# Patient Record
Sex: MALE | Race: WHITE | NOT HISPANIC OR LATINO | Employment: UNEMPLOYED | URBAN - METROPOLITAN AREA
[De-identification: names, ages, dates, MRNs, and addresses within clinical notes are randomized per-mention and may not be internally consistent; named-entity substitution may affect disease eponyms.]

---

## 2022-12-18 ENCOUNTER — APPOINTMENT (OUTPATIENT)
Dept: CT IMAGING | Facility: HOSPITAL | Age: 47
End: 2022-12-18

## 2022-12-18 ENCOUNTER — APPOINTMENT (OUTPATIENT)
Dept: ULTRASOUND IMAGING | Facility: HOSPITAL | Age: 47
End: 2022-12-18

## 2022-12-18 ENCOUNTER — HOSPITAL ENCOUNTER (EMERGENCY)
Facility: HOSPITAL | Age: 47
Discharge: HOME | End: 2022-12-19
Attending: EMERGENCY MEDICINE | Admitting: EMERGENCY MEDICINE

## 2022-12-18 VITALS
BODY MASS INDEX: 32.88 KG/M2 | HEART RATE: 57 BPM | SYSTOLIC BLOOD PRESSURE: 183 MMHG | OXYGEN SATURATION: 92 % | DIASTOLIC BLOOD PRESSURE: 109 MMHG | HEIGHT: 68 IN | TEMPERATURE: 97.6 F | RESPIRATION RATE: 20 BRPM | WEIGHT: 216.93 LBS

## 2022-12-18 DIAGNOSIS — C78.7 PANCREATIC CANCER METASTASIZED TO LIVER: ICD-10-CM

## 2022-12-18 DIAGNOSIS — R10.84 GENERALIZED ABDOMINAL PAIN: Primary | ICD-10-CM

## 2022-12-18 DIAGNOSIS — C25.9 PANCREATIC CANCER METASTASIZED TO LIVER: ICD-10-CM

## 2022-12-18 LAB
ALBUMIN SERPL-MCNC: 3.7 G/DL (ref 3.5–5.2)
ALBUMIN/GLOB SERPL: 1 G/DL
ALP SERPL-CCNC: 516 U/L (ref 39–117)
ALT SERPL W P-5'-P-CCNC: 79 U/L (ref 1–41)
ANION GAP SERPL CALCULATED.3IONS-SCNC: 10.6 MMOL/L (ref 5–15)
AST SERPL-CCNC: 47 U/L (ref 1–40)
BASOPHILS # BLD AUTO: 0.03 10*3/MM3 (ref 0–0.2)
BASOPHILS NFR BLD AUTO: 0.5 % (ref 0–1.5)
BILIRUB SERPL-MCNC: 1.7 MG/DL (ref 0–1.2)
BILIRUB UR QL STRIP: ABNORMAL
BUN SERPL-MCNC: 14 MG/DL (ref 6–20)
BUN/CREAT SERPL: 16.3 (ref 7–25)
CALCIUM SPEC-SCNC: 9.7 MG/DL (ref 8.6–10.5)
CHLORIDE SERPL-SCNC: 98 MMOL/L (ref 98–107)
CLARITY UR: CLEAR
CO2 SERPL-SCNC: 26.4 MMOL/L (ref 22–29)
COLOR UR: ABNORMAL
CREAT SERPL-MCNC: 0.86 MG/DL (ref 0.76–1.27)
DEPRECATED RDW RBC AUTO: 42.3 FL (ref 37–54)
EGFRCR SERPLBLD CKD-EPI 2021: 108.1 ML/MIN/1.73
EOSINOPHIL # BLD AUTO: 0.12 10*3/MM3 (ref 0–0.4)
EOSINOPHIL NFR BLD AUTO: 2 % (ref 0.3–6.2)
ERYTHROCYTE [DISTWIDTH] IN BLOOD BY AUTOMATED COUNT: 13.1 % (ref 12.3–15.4)
GLOBULIN UR ELPH-MCNC: 3.6 GM/DL
GLUCOSE SERPL-MCNC: 273 MG/DL (ref 65–99)
GLUCOSE UR STRIP-MCNC: ABNORMAL MG/DL
HCT VFR BLD AUTO: 40.6 % (ref 37.5–51)
HGB BLD-MCNC: 13.6 G/DL (ref 13–17.7)
HGB UR QL STRIP.AUTO: NEGATIVE
HOLD SPECIMEN: NORMAL
HOLD SPECIMEN: NORMAL
IMM GRANULOCYTES # BLD AUTO: 0.1 10*3/MM3 (ref 0–0.05)
IMM GRANULOCYTES NFR BLD AUTO: 1.7 % (ref 0–0.5)
INR PPP: 1.06 (ref 0.86–1.15)
KETONES UR QL STRIP: NEGATIVE
LEUKOCYTE ESTERASE UR QL STRIP.AUTO: NEGATIVE
LIPASE SERPL-CCNC: 34 U/L (ref 13–60)
LYMPHOCYTES # BLD AUTO: 1.67 10*3/MM3 (ref 0.7–3.1)
LYMPHOCYTES NFR BLD AUTO: 27.6 % (ref 19.6–45.3)
MCH RBC QN AUTO: 29.8 PG (ref 26.6–33)
MCHC RBC AUTO-ENTMCNC: 33.5 G/DL (ref 31.5–35.7)
MCV RBC AUTO: 89 FL (ref 79–97)
MONOCYTES # BLD AUTO: 0.72 10*3/MM3 (ref 0.1–0.9)
MONOCYTES NFR BLD AUTO: 11.9 % (ref 5–12)
NEUTROPHILS NFR BLD AUTO: 3.41 10*3/MM3 (ref 1.7–7)
NEUTROPHILS NFR BLD AUTO: 56.3 % (ref 42.7–76)
NITRITE UR QL STRIP: NEGATIVE
NRBC BLD AUTO-RTO: 0 /100 WBC (ref 0–0.2)
PH UR STRIP.AUTO: 7.5 [PH] (ref 5–8)
PLATELET # BLD AUTO: 229 10*3/MM3 (ref 140–450)
PMV BLD AUTO: 10.6 FL (ref 6–12)
POTASSIUM SERPL-SCNC: 4.2 MMOL/L (ref 3.5–5.2)
PROT SERPL-MCNC: 7.3 G/DL (ref 6–8.5)
PROT UR QL STRIP: ABNORMAL
PROTHROMBIN TIME: 14 SECONDS (ref 11.8–14.9)
RBC # BLD AUTO: 4.56 10*6/MM3 (ref 4.14–5.8)
SODIUM SERPL-SCNC: 135 MMOL/L (ref 136–145)
SP GR UR STRIP: >1.03 (ref 1–1.03)
UROBILINOGEN UR QL STRIP: ABNORMAL
WBC NRBC COR # BLD: 6.05 10*3/MM3 (ref 3.4–10.8)
WHOLE BLOOD HOLD COAG: NORMAL
WHOLE BLOOD HOLD SPECIMEN: NORMAL

## 2022-12-18 PROCEDURE — 25010000002 ONDANSETRON PER 1 MG: Performed by: EMERGENCY MEDICINE

## 2022-12-18 PROCEDURE — 80053 COMPREHEN METABOLIC PANEL: CPT

## 2022-12-18 PROCEDURE — 83690 ASSAY OF LIPASE: CPT

## 2022-12-18 PROCEDURE — 81003 URINALYSIS AUTO W/O SCOPE: CPT | Performed by: EMERGENCY MEDICINE

## 2022-12-18 PROCEDURE — 99283 EMERGENCY DEPT VISIT LOW MDM: CPT

## 2022-12-18 PROCEDURE — 85610 PROTHROMBIN TIME: CPT | Performed by: EMERGENCY MEDICINE

## 2022-12-18 PROCEDURE — 36415 COLL VENOUS BLD VENIPUNCTURE: CPT

## 2022-12-18 PROCEDURE — 25010000002 DROPERIDOL PER 5 MG: Performed by: NURSE PRACTITIONER

## 2022-12-18 PROCEDURE — 96361 HYDRATE IV INFUSION ADD-ON: CPT

## 2022-12-18 PROCEDURE — 25010000002 KETOROLAC TROMETHAMINE PER 15 MG: Performed by: EMERGENCY MEDICINE

## 2022-12-18 PROCEDURE — 96375 TX/PRO/DX INJ NEW DRUG ADDON: CPT

## 2022-12-18 PROCEDURE — 25010000002 DICYCLOMINE PER 20 MG: Performed by: NURSE PRACTITIONER

## 2022-12-18 PROCEDURE — 76705 ECHO EXAM OF ABDOMEN: CPT

## 2022-12-18 PROCEDURE — 85025 COMPLETE CBC W/AUTO DIFF WBC: CPT

## 2022-12-18 PROCEDURE — 74177 CT ABD & PELVIS W/CONTRAST: CPT

## 2022-12-18 PROCEDURE — 0 IOPAMIDOL PER 1 ML: Performed by: EMERGENCY MEDICINE

## 2022-12-18 PROCEDURE — 96372 THER/PROPH/DIAG INJ SC/IM: CPT

## 2022-12-18 PROCEDURE — 96374 THER/PROPH/DIAG INJ IV PUSH: CPT

## 2022-12-18 RX ORDER — SODIUM CHLORIDE 0.9 % (FLUSH) 0.9 %
10 SYRINGE (ML) INJECTION AS NEEDED
Status: DISCONTINUED | OUTPATIENT
Start: 2022-12-18 | End: 2022-12-19 | Stop reason: HOSPADM

## 2022-12-18 RX ORDER — DICYCLOMINE HYDROCHLORIDE 10 MG/ML
20 INJECTION INTRAMUSCULAR ONCE
Status: COMPLETED | OUTPATIENT
Start: 2022-12-18 | End: 2022-12-18

## 2022-12-18 RX ORDER — ONDANSETRON 2 MG/ML
4 INJECTION INTRAMUSCULAR; INTRAVENOUS ONCE
Status: COMPLETED | OUTPATIENT
Start: 2022-12-18 | End: 2022-12-18

## 2022-12-18 RX ORDER — DROPERIDOL 2.5 MG/ML
2.5 INJECTION, SOLUTION INTRAMUSCULAR; INTRAVENOUS ONCE
Status: COMPLETED | OUTPATIENT
Start: 2022-12-18 | End: 2022-12-18

## 2022-12-18 RX ORDER — KETOROLAC TROMETHAMINE 30 MG/ML
30 INJECTION, SOLUTION INTRAMUSCULAR; INTRAVENOUS ONCE
Status: COMPLETED | OUTPATIENT
Start: 2022-12-18 | End: 2022-12-18

## 2022-12-18 RX ADMIN — IOPAMIDOL 100 ML: 755 INJECTION, SOLUTION INTRAVENOUS at 20:30

## 2022-12-18 RX ADMIN — KETOROLAC TROMETHAMINE 30 MG: 30 INJECTION, SOLUTION INTRAMUSCULAR; INTRAVENOUS at 20:01

## 2022-12-18 RX ADMIN — ONDANSETRON 4 MG: 2 INJECTION INTRAMUSCULAR; INTRAVENOUS at 20:01

## 2022-12-18 RX ADMIN — DICYCLOMINE HYDROCHLORIDE 20 MG: 20 INJECTION, SOLUTION INTRAMUSCULAR at 22:11

## 2022-12-18 RX ADMIN — SODIUM CHLORIDE 1000 ML: 9 INJECTION, SOLUTION INTRAVENOUS at 22:00

## 2022-12-18 RX ADMIN — DROPERIDOL 2.5 MG: 2.5 INJECTION, SOLUTION INTRAMUSCULAR; INTRAVENOUS at 22:02

## 2022-12-19 PROCEDURE — 96375 TX/PRO/DX INJ NEW DRUG ADDON: CPT

## 2022-12-19 PROCEDURE — 25010000002 MORPHINE PER 10 MG: Performed by: EMERGENCY MEDICINE

## 2022-12-19 RX ORDER — DICYCLOMINE HCL 20 MG
20 TABLET ORAL EVERY 6 HOURS
Qty: 20 TABLET | Refills: 0 | Status: SHIPPED | OUTPATIENT
Start: 2022-12-19

## 2022-12-19 RX ORDER — HYDROCODONE BITARTRATE AND ACETAMINOPHEN 7.5; 325 MG/1; MG/1
1 TABLET ORAL EVERY 6 HOURS PRN
Qty: 10 TABLET | Refills: 0 | Status: CANCELLED | OUTPATIENT
Start: 2022-12-19

## 2022-12-19 RX ORDER — ONDANSETRON 4 MG/1
4 TABLET, ORALLY DISINTEGRATING ORAL EVERY 8 HOURS PRN
Qty: 10 TABLET | Refills: 0 | Status: SHIPPED | OUTPATIENT
Start: 2022-12-19

## 2022-12-19 RX ORDER — PROMETHAZINE HYDROCHLORIDE 25 MG/1
25 SUPPOSITORY RECTAL EVERY 6 HOURS PRN
Qty: 10 SUPPOSITORY | Refills: 0 | Status: SHIPPED | OUTPATIENT
Start: 2022-12-19

## 2022-12-19 RX ADMIN — MORPHINE SULFATE 4 MG: 4 INJECTION, SOLUTION INTRAMUSCULAR; INTRAVENOUS at 00:58

## 2022-12-19 NOTE — DISCHARGE INSTRUCTIONS
No other acute findings noted other than  your pancreatic mass and some liver metastasis    Follow-up with your oncologist in [place].  Call today to discuss any additional treatment or pain management.    Take medications as prescribed for symptomatic treatment

## 2022-12-19 NOTE — ED NOTES
Pt complains of abdominal pain the the center abd that started 3-4 days ago. Pt is bent over in bed in pain and tearful. Pt had last chemo treatment approx. 2 weeks ago.

## 2022-12-19 NOTE — ED PROVIDER NOTES
Time: 7:38 PM EST  Chief Complaint:   Chief Complaint   Patient presents with   • Abdominal Pain           History of Present Illness:  Patient is a 46 y.o. year old male who presents to the emergency department with periumbilical abdominal pain and dry heaving x3 days.  History of pancreatic cancer.  Patient denies fever, chills, vomiting and diarrhea.  Denies a history of diverticulitis or Crohn's.  Patient currently receiving chemo every 2 weeks in [place] by Dr. Charles.  Patient canceled chemo this past week on Wednesday because he did not want to be sick for [place] so its been almost 3 weeks      History provided by:  Patient  Abdominal Pain  Pain location:  Generalized  Pain quality: aching, cramping and sharp    Pain radiates to:  Does not radiate  Pain severity:  Moderate  Onset quality:  Gradual  Duration:  3 days  Timing:  Constant  Progression:  Unchanged  Chronicity:  New  Relieved by:  Nothing  Worsened by:  Eating, palpation, vomiting and movement  Ineffective treatments: home nausea med not helping.  Associated symptoms: nausea    Associated symptoms: no anorexia, no belching, no chest pain, no chills, no constipation, no cough, no diarrhea, no dysuria, no fatigue, no fever, no flatus, no hematemesis, no hematochezia, no hematuria, no melena, no shortness of breath, no sore throat and no vomiting            Patient Care Team  Primary Care Provider: Provider, No Known    Past Medical History:     No Known Allergies  History reviewed. No pertinent past medical history.  History reviewed. No pertinent surgical history.  History reviewed. No pertinent family history.    Home Medications:  Prior to Admission medications    Not on File        Social History:          Review of Systems:  Review of Systems   Constitutional: Negative for chills, fatigue and fever.   HENT: Negative for ear pain, rhinorrhea and sore throat.    Eyes: Negative for visual disturbance.   Respiratory: Negative for cough and  "shortness of breath.    Cardiovascular: Negative for chest pain.   Gastrointestinal: Positive for abdominal pain and nausea. Negative for anorexia, constipation, diarrhea, flatus, hematemesis, hematochezia, melena and vomiting.   Genitourinary: Negative for difficulty urinating, dysuria, flank pain and hematuria.   Musculoskeletal: Negative for arthralgias, back pain and myalgias.   Skin: Negative for rash.   Neurological: Negative for light-headedness and headaches.   Hematological: Negative for adenopathy.   Psychiatric/Behavioral: Negative.         Physical Exam:  BP (!) 183/109   Pulse 57   Temp 97.6 °F (36.4 °C) (Oral)   Resp 20   Ht 172.7 cm (68\")   Wt 98.4 kg (216 lb 14.9 oz)   SpO2 92%   BMI 32.98 kg/m²     Physical Exam  Vitals and nursing note reviewed.   Constitutional:       General: He is not in acute distress.     Appearance: Normal appearance. He is not toxic-appearing.   HENT:      Head: Normocephalic and atraumatic.      Nose: Nose normal.      Mouth/Throat:      Mouth: Mucous membranes are moist.   Eyes:      Extraocular Movements: Extraocular movements intact.      Conjunctiva/sclera: Conjunctivae normal.   Cardiovascular:      Rate and Rhythm: Normal rate and regular rhythm.      Pulses: Normal pulses.      Heart sounds: Normal heart sounds.   Pulmonary:      Effort: Pulmonary effort is normal.      Breath sounds: Normal breath sounds.   Abdominal:      General: Bowel sounds are normal. There is no distension.      Palpations: Abdomen is soft.      Tenderness: There is generalized abdominal tenderness. There is no right CVA tenderness or left CVA tenderness.   Musculoskeletal:         General: Normal range of motion.      Cervical back: Normal range of motion.   Skin:     General: Skin is warm and dry.      Coloration: Skin is not cyanotic.   Neurological:      General: No focal deficit present.      Mental Status: He is alert and oriented to person, place, and time.   Psychiatric:         " Attention and Perception: Attention and perception normal.         Mood and Affect: Mood normal.         Behavior: Behavior normal.         Thought Content: Thought content normal.         Judgment: Judgment normal.                Medications in the Emergency Department:  Medications   sodium chloride 0.9 % flush 10 mL (has no administration in time range)   morphine injection 4 mg (has no administration in time range)   ondansetron (ZOFRAN) injection 4 mg (4 mg Intravenous Given 12/18/22 2001)   ketorolac (TORADOL) injection 30 mg (30 mg Intravenous Given 12/18/22 2001)   iopamidol (ISOVUE-370) 76 % injection 100 mL (100 mL Intravenous Given 12/18/22 2030)   dicyclomine (BENTYL) injection 20 mg (20 mg Intramuscular Given 12/18/22 2211)   droperidol (INAPSINE) injection 2.5 mg (2.5 mg Intravenous Given 12/18/22 2202)   sodium chloride 0.9 % bolus 1,000 mL (1,000 mL Intravenous New Bag 12/18/22 2200)        Labs  Lab Results (last 24 hours)     Procedure Component Value Units Date/Time    CBC & Differential [[id number]]  (Abnormal) Collected: 12/18/22 1957    Specimen: Blood Updated: 12/18/22 2004    Narrative:      The following orders were created for panel order CBC & Differential.  Procedure                               Abnormality         Status                     ---------                               -----------         ------                     CBC Auto Differential[[id number]]        Abnormal            Final result                 Please view results for these tests on the individual orders.    Comprehensive Metabolic Panel [[id number]]  (Abnormal) Collected: 12/18/22 1957    Specimen: Blood Updated: 12/18/22 2023     Glucose 273 mg/dL      BUN 14 mg/dL      Creatinine 0.86 mg/dL      Sodium 135 mmol/L      Potassium 4.2 mmol/L      Chloride 98 mmol/L      CO2 26.4 mmol/L      Calcium 9.7 mg/dL      Total Protein 7.3 g/dL      Albumin 3.70 g/dL      ALT (SGPT) 79 U/L      AST (SGOT) 47 U/L      Alkaline  Phosphatase 516 U/L      Total Bilirubin 1.7 mg/dL      Globulin 3.6 gm/dL      A/G Ratio 1.0 g/dL      BUN/Creatinine Ratio 16.3     Anion Gap 10.6 mmol/L      eGFR 108.1 mL/min/1.73      Comment: National Kidney Foundation and American Society of Nephrology (ASN) Task Force recommended calculation based on the Chronic Kidney Disease Epidemiology Collaboration (CKD-EPI) equation refit without adjustment for race.       Narrative:      GFR Normal >60  Chronic Kidney Disease <60  Kidney Failure <15      Lipase [[id number]]  (Normal) Collected: 12/18/22 1957    Specimen: Blood Updated: 12/18/22 2023     Lipase 34 U/L     CBC Auto Differential [[id number]]  (Abnormal) Collected: 12/18/22 1957    Specimen: Blood Updated: 12/18/22 2004     WBC 6.05 10*3/mm3      RBC 4.56 10*6/mm3      Hemoglobin 13.6 g/dL      Hematocrit 40.6 %      MCV 89.0 fL      MCH 29.8 pg      MCHC 33.5 g/dL      RDW 13.1 %      RDW-SD 42.3 fl      MPV 10.6 fL      Platelets 229 10*3/mm3      Neutrophil % 56.3 %      Lymphocyte % 27.6 %      Monocyte % 11.9 %      Eosinophil % 2.0 %      Basophil % 0.5 %      Immature Grans % 1.7 %      Neutrophils, Absolute 3.41 10*3/mm3      Lymphocytes, Absolute 1.67 10*3/mm3      Monocytes, Absolute 0.72 10*3/mm3      Eosinophils, Absolute 0.12 10*3/mm3      Basophils, Absolute 0.03 10*3/mm3      Immature Grans, Absolute 0.10 10*3/mm3      nRBC 0.0 /100 WBC     Protime-INR [[id number]]  (Normal) Collected: 12/18/22 1957    Specimen: Blood Updated: 12/18/22 2038     Protime 14.0 Seconds      INR 1.06    Narrative:      Suggested Therapeutic Ranges For Oral Anticoagulant Therapy:  Level of Therapy                      INR Target Range  Standard Dose                            2.0-3.0  High Dose                                2.5-3.5  Patients not receiving anticoagulant  Therapy Normal Range                     0.86-1.15    Urinalysis With Microscopic If Indicated (No Culture) - Urine, Clean Catch [[id number]]   (Abnormal) Collected: 12/18/22 2125    Specimen: Urine, Clean Catch Updated: 12/18/22 2133     Color, UA Dark Yellow     Appearance, UA Clear     pH, UA 7.5     Specific Gravity, UA >1.030     Glucose, UA >=1000 mg/dL (3+)     Ketones, UA Negative     Bilirubin, UA Small (1+)     Blood, UA Negative     Protein, UA Trace     Leuk Esterase, UA Negative     Nitrite, UA Negative     Urobilinogen, UA 2.0 E.U./dL    Narrative:      Urine microscopic not indicated.           Imaging:  CT Abdomen Pelvis With Contrast    Result Date: 12/18/2022  PROCEDURE: CT ABDOMEN PELVIS W CONTRAST  COMPARISON: None.  INDICATIONS: GENERALIZED ABDOMINAL PAIN; H/O PANCREATIC, LIVER, & TESTICULAR CANCER PER PT.  TECHNIQUE: After obtaining the patient's consent, 674 CT images were created with non-ionic intravenous contrast material.  No oral contrast agent was administered for the study.  PROTOCOL:   Standard CT imaging protocol performed.    RADIATION:   DLP: 1192mGy*cm   Automated exposure control was utilized to minimize radiation dose. CONTRAST: 93 mL Isovue 370 I.V. LABS:   eGFR: >60 mL/min/1.73m^2  FINDINGS: Hepatic metastases are seen.  These findings are estimated at about 5 cm in greatest axial diameter.  They measure about 6.1 cm in greatest craniocaudal extent (such as seen on image 72 of series 202). Probably no hepatosplenomegaly.  There may be extension of a large liver metastasis beyond the hepatic capsule into the lower posterolateral right chest wall and/or right upper abdominal wall (posterolateral aspect), such as seen on image 28 of series 201.  Grossly, no portal vein thrombosis is suggested.  There is abnormal prominence of the tail of the pancreas with a suspected necrotic pancreatic mass, measuring about 7.1 x 5 cm in long-axis axial and short-axis axial dimensions, respectively, as on image 40 of series 201.  This finding measures nearly 4 cm in greatest craniocaudal extent (as on image 65 of series 202).  This  finding likely corresponds to the patient's known pancreatic mass.  Enlarged upper abdominal lymph nodes are seen, especially in the paragastric, portacaval, madeline hepatis regions.  There is an abnormal thickened appearance of the gallbladder wall.  Gallstones cannot be excluded.  Age-indeterminate cholecystitis is possible.  A malignant process involving the gallbladder cannot be excluded.  No definite biliary ductal dilatation is seen.  No choledocholithiasis is suggested.  No aneurysmal dilatation of the aorta is suggested.  No acute intraperitoneal or retroperitoneal hemorrhage.  No enhanced CT evidence of nephrolithiasis or ureterolithiasis.  No acute pyelonephritis.  No hydronephrosis.  No adrenal mass.  No mechanical bowel obstruction.  No definite bowel wall thickening.  No acute appendicitis.  There may be scattered colonic diverticula.  No acute diverticulitis or colitis.  No pneumoperitoneum or pneumatosis.  No urinary bladder calculi are seen.  The patient may have undergone left-sided orchiectomy.  Please correlate clinically.  Probably no retroperitoneal lymphadenopathy is seen.  There are small scattered retroperitoneal lymph nodes.  There is probably osteonecrosis of the femoral heads, which is age-indeterminate, but probably chronic.  No definite osseous metastatic disease is suggested. Extensive ossification of the anterior longitudinal ligament is seen and may represent diffuse idiopathic skeletal hyperostosis (DISH).  A small fat-containing umbilical hernia is identified.  It does not contain bowel.  There are coronary artery calcifications.  Minimal, if any, ascites is seen.        1. Advanced metastatic pancreatic carcinoma is suggested with hepatic and lymphatic metastases noted.  2. The patient may have undergone left-sided orchiectomy.  3. Osteonecrosis involves the bilateral femoral heads.  4. No definite osseous metastatic disease is appreciated.  5. There are coronary artery  calcifications.  6. There is a nonspecific abnormal CT appearance of the gallbladder.  7. Consider correlation with any relevant outside imaging studies if available.  8. Otherwise, no acute findings are seen. 9. Please see above comments for further detail.   1.   Please note that portions of this note were completed with a voice recognition program.  GABBY HINOJOSA JR, MD       Electronically Signed and Approved By: GABBY HINOJOSA JR, MD on 12/18/2022 at 21:53              US Gallbladder    Result Date: 12/19/2022  PROCEDURE: US GALLBLADDER  COMPARISON: Abdominal/pelvic CT examination, with intravenous contrast agent, 12/18/2022.  INDICATIONS: Unspecified abdominal pain; h/o pancreatic carcinoma.  TECHNIQUE: A limited abdominal ultrasound examination of the right upper quadrant was performed, tailored in order to evaluate the gallbladder and the biliary tree.   FINDINGS: Two-dimensional grayscale images as well as color and spectral Doppler analysis are provided for review.  Again demonstrated are multiple hepatic metastases.  They may measure as great is 6 cm.  The gallbladder is not well seen.  Diffuse gallbladder wall thickening is suggested and is roughly estimated at 6 mm in greatest thickness.  This finding is age-indeterminate.  It is unable to be determined by this study as to whether or not there are gallstones.  The pancreas is not well seen, obscured by bowel gas.  There is a partially imaged hypoechoic mass involving the pancreatic tail and body, which measures about 4.3 cm in maximum diameter by this ultrasound examination.  No right-sided hydronephrosis is seen.  No biliary ductal dilatation is suggested.  A negative sonographic Silverio's sign was reported.  Doppler interrogation of the hepatic vasculature reveals patent vessels with normal blood flow direction.  The maximum craniocaudal dimension of the liver by ultrasound is 17.2 cm.  The pole to pole length of the right kidney is 12.2 cm.  The  maximum diameter of the common bile duct is 3.5 mm.  The left kidney, spleen, inferior vena cava (IVC), and abdominal aorta were not evaluated.        1. Age-indeterminate gallbladder wall thickening is seen.  Gallstones are not definitely identified.  2. A negative sonographic Silverio's sign was reported.  3. No biliary ductal dilatation.  4. Hepatic metastases are present.  5. There is a pancreatic tail mass, measuring about 4.3 cm.  6. No ascites. 7. No right hydronephrosis.  8. Please see above comments for further detail.    Please note that portions of this note were completed with a voice recognition program.  GABBY HINOJOSA JR, MD       Electronically Signed and Approved By: GABBY HINOJOSA JR, MD on 12/19/2022 at 0:22               Procedures:  Procedures    Progress  ED Course as of 12/19/22 0052   Sun Dec 18, 2022   1940 --- PROVIDER IN TRIAGE NOTE ---    The patient was evaluated my Clint ortega in triage. Orders were placed and the patient is currently awaiting disposition.    [AJ]      ED Course User Index  [AJ] Clint Velasquez PA-C                            The patient was initially evaluated in the triage area where orders were placed. The patient was later dispositioned by SIVAKUMAR Guillen.      The patient was advised to stay for completion of workup which includes but is not limited to communication of labs and radiological results, reassessment and plan. The patient was advised that leaving prior to disposition by a provider could result in critical findings that are not communicated to the patient.     Medical Decision Making:  MDM  Number of Diagnoses or Management Options  Generalized abdominal pain  Pancreatic cancer metastasized to liver (HCC)  Diagnosis management comments: The patient is resting comfortably and feels better, is alert and in no distress. Repeat examination is unremarkable and benign; in particular, there's no discomfort at McBurney's point and there is no  pulsatile mass. The history, exam, diagnostic testing, and current condition does not suggest acute appendicitis, bowel obstruction, acute cholecystitis, bowel perforation, major gastrointestinal bleeding, severe diverticulitis, abdominal aortic aneurysm, mesenteric ischemia, volvulus, sepsis, or other significant pathology that warrants further testing, continued ED treatment, admission, for surgical evaluation at this point. The vital signs have been stable. The patient does not have uncontrollable pain, intractable vomiting, or other significant symptoms. The patient's condition is stable and appropriate for discharge from the emergency department.         Amount and/or Complexity of Data Reviewed  Clinical lab tests: reviewed and ordered  Tests in the radiology section of CPT®: reviewed and ordered  Tests in the medicine section of CPT®: ordered and reviewed  Decide to obtain previous medical records or to obtain history from someone other than the patient: yes    Risk of Complications, Morbidity, and/or Mortality  Presenting problems: moderate  Diagnostic procedures: moderate  Management options: low    Patient Progress  Patient progress: stable           The following orders were placed after triage and evaluation:  Orders Placed This Encounter   Procedures   • CT Abdomen Pelvis With Contrast   • US Gallbladder   • [place] Draw   • Comprehensive Metabolic Panel   • Lipase   • Urinalysis With Microscopic If Indicated (No Culture) - Urine, Clean Catch   • CBC Auto Differential   • Protime-INR   • NPO Diet NPO Type: Strict NPO   • Undress & Gown   • Insert Peripheral IV   • CBC & Differential   • Green Top (Gel)   • Lavender Top   • Gold Top - SST   • Light Blue Top       Final diagnoses:   Generalized abdominal pain   Pancreatic cancer metastasized to liver (HCC)          Disposition:  ED Disposition     ED Disposition   Discharge    Condition   Stable    Comment   --             This medical record created  using voice recognition software.           Radha Mistry, APRN  12/19/22 0052

## 2023-01-09 ENCOUNTER — TELEPHONE (OUTPATIENT)
Dept: FAMILY MEDICINE CLINIC | Facility: CLINIC | Age: 48
End: 2023-01-09
Payer: MEDICAID

## 2023-01-09 NOTE — TELEPHONE ENCOUNTER
Caller: GRETA    Relationship: [facility] PHARMACIST    Best call back number: [phone]    What is the best time to reach you: ANY    Who are you requesting to speak with (clinical staff, provider,  specific staff member): CLINICAL STAFF    What was the call regarding: GRETA FROM [facility] CALLED WANTING TO KNOW IF THERE IS ANY WAS ANTONINA WOULD AUTHORIZE A REFILL ON THE PATIENTS MEDICATION AS HE IS OUT OF SEVERAL REGULAR MEDICATIONS BUT HAS YET TO ESTABLISH CARE.    Do you require a callback: YES

## 2023-01-10 RX ORDER — AMIODARONE HYDROCHLORIDE 200 MG/1
200 TABLET ORAL DAILY
Qty: 14 TABLET | Refills: 0 | Status: SHIPPED | OUTPATIENT
Start: 2023-01-10

## 2023-01-10 NOTE — TELEPHONE ENCOUNTER
I have moved pt up to 1.17.22  Pts wife was wondering if there is any way we could do a emergency refill on Amlodrone 200mg 1 tab daily as he can not go without this med due to his heart condition. She stated the others could wait

## 2023-01-10 NOTE — TELEPHONE ENCOUNTER
I can't fill prescriptions without seeing him.  He can come in sooner as a work in if there is a open time slot.

## 2023-01-11 ENCOUNTER — HOSPITAL ENCOUNTER (EMERGENCY)
Facility: HOSPITAL | Age: 48
End: 2023-01-11
Payer: MEDICAID